# Patient Record
Sex: MALE | Race: WHITE | NOT HISPANIC OR LATINO | URBAN - METROPOLITAN AREA
[De-identification: names, ages, dates, MRNs, and addresses within clinical notes are randomized per-mention and may not be internally consistent; named-entity substitution may affect disease eponyms.]

---

## 2017-02-10 ENCOUNTER — EMERGENCY (EMERGENCY)
Facility: HOSPITAL | Age: 42
LOS: 1 days | Discharge: HOME | End: 2017-02-10
Attending: EMERGENCY MEDICINE | Admitting: EMERGENCY MEDICINE
Payer: COMMERCIAL

## 2017-02-10 VITALS
DIASTOLIC BLOOD PRESSURE: 84 MMHG | SYSTOLIC BLOOD PRESSURE: 124 MMHG | WEIGHT: 199.96 LBS | HEIGHT: 66 IN | HEART RATE: 75 BPM | TEMPERATURE: 98 F | RESPIRATION RATE: 18 BRPM | OXYGEN SATURATION: 97 %

## 2017-02-10 DIAGNOSIS — Z95.2 PRESENCE OF PROSTHETIC HEART VALVE: Chronic | ICD-10-CM

## 2017-02-10 DIAGNOSIS — S22.31XA FRACTURE OF ONE RIB, RIGHT SIDE, INITIAL ENCOUNTER FOR CLOSED FRACTURE: ICD-10-CM
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
## 2017-02-10 DIAGNOSIS — R07.82 INTERCOSTAL PAIN: ICD-10-CM
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
## 2017-02-10 PROCEDURE — 99053 MED SERV 10PM-8AM 24 HR FAC: CPT

## 2017-02-10 PROCEDURE — 71101 X-RAY EXAM UNILAT RIBS/CHEST: CPT

## 2017-02-10 PROCEDURE — 99284 EMERGENCY DEPT VISIT MOD MDM: CPT | Mod: 25

## 2017-02-10 PROCEDURE — 71101 X-RAY EXAM UNILAT RIBS/CHEST: CPT | Mod: 26

## 2017-02-10 PROCEDURE — 99283 EMERGENCY DEPT VISIT LOW MDM: CPT | Mod: 25

## 2017-02-10 RX ORDER — OXYCODONE HYDROCHLORIDE 5 MG/1
1 TABLET ORAL
Qty: 15 | Refills: 0 | OUTPATIENT
Start: 2017-02-10

## 2017-02-10 RX ORDER — IBUPROFEN 200 MG
600 TABLET ORAL ONCE
Qty: 0 | Refills: 0 | Status: COMPLETED | OUTPATIENT
Start: 2017-02-10 | End: 2017-02-10

## 2017-02-10 RX ADMIN — Medication 600 MILLIGRAM(S): at 23:33

## 2017-02-10 NOTE — ED ADULT NURSE NOTE - OBJECTIVE STATEMENT
40 yo male pt presents to ed complaining of fall. as per pt he was walking into work when he slipped and fell on the stairs outside of the hospital. pt is complaining of pain in the lower back where he hit. pt walks with steady gait. no motor or sensory loss noted. safety maintained. 42 yo male pt presents to ed complaining of fall. as per pt he was walking into work when he slipped and fell on the stairs outside of the hospital. pt is complaining of pain in the lower back where he hit. pt denies hitting head. pt walks with steady gait. no motor or sensory loss noted. no bruising or deformities noted. safety maintained.

## 2017-02-10 NOTE — ED PROVIDER NOTE - PLAN OF CARE
Be hydrated  Inspirometer 10times / hour, 5-6times a day.  Motrin 600mg every 8hours for pain with food  Oxycodone as directed for severe pain with caution of drowsiness and constipation  Stool softener as needed for pain  Please follow up with your Primary MD in 2-3days for reevaluation  Seek immediate medical care for any new/worsening signs or symptoms.

## 2017-02-10 NOTE — ED PROVIDER NOTE - CARE PLAN
Principal Discharge DX:	Closed fracture of one rib of right side, initial encounter Principal Discharge DX:	Closed fracture of one rib of right side, initial encounter  Instructions for follow-up, activity and diet:	Be hydrated  Inspirometer 10times / hour, 5-6times a day.  Motrin 600mg every 8hours for pain with food  Oxycodone as directed for severe pain with caution of drowsiness and constipation  Stool softener as needed for pain  Please follow up with your Primary MD in 2-3days for reevaluation  Seek immediate medical care for any new/worsening signs or symptoms.

## 2017-02-10 NOTE — ED PROVIDER NOTE - PHYSICAL EXAMINATION
Attending note. Patient is alert oriented and in moderate pain. Spine is nontender to palpation. There is no CVA tenderness. Patient has tenderness on the right posterior lateral lower ribs. Upper extremities are normal. Lower extremities are normal. Lungs are equal and clear bilaterally.

## 2017-02-10 NOTE — ED PROVIDER NOTE - OBJECTIVE STATEMENT
Attending note. Patient was seen in fast track from #1. 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 Patient was coming to work a shift when he slipped on ice and snow covered stairs in the parking lot. Patient fell onto his right side complaining of right posterior and lateral rib pain. Pain is sharp exacerbated by movement and deep inspiration. Patient denies any shortness of breath. Patient denies any back pain or extremity pain. Patient had a sternotomy for open heart surgery at 14 years old.

## 2017-02-10 NOTE — ED ADULT TRIAGE NOTE - CHIEF COMPLAINT QUOTE
slipped on ice, fell down stairs at entrance of hospital building, no head trauma/LOC, c/o back pain, +ambulatory

## 2017-02-11 VITALS — RESPIRATION RATE: 18 BRPM | OXYGEN SATURATION: 98 % | HEART RATE: 81 BPM
